# Patient Record
Sex: FEMALE | Race: WHITE | NOT HISPANIC OR LATINO
[De-identification: names, ages, dates, MRNs, and addresses within clinical notes are randomized per-mention and may not be internally consistent; named-entity substitution may affect disease eponyms.]

---

## 2017-07-06 ENCOUNTER — APPOINTMENT (OUTPATIENT)
Dept: OTOLARYNGOLOGY | Facility: CLINIC | Age: 72
End: 2017-07-06

## 2017-07-06 VITALS
HEART RATE: 60 BPM | BODY MASS INDEX: 26.88 KG/M2 | HEIGHT: 71 IN | WEIGHT: 192 LBS | SYSTOLIC BLOOD PRESSURE: 119 MMHG | DIASTOLIC BLOOD PRESSURE: 66 MMHG

## 2017-07-06 DIAGNOSIS — J03.90 ACUTE TONSILLITIS, UNSPECIFIED: ICD-10-CM

## 2017-07-06 DIAGNOSIS — H81.399 OTHER PERIPHERAL VERTIGO, UNSPECIFIED EAR: ICD-10-CM

## 2017-07-06 DIAGNOSIS — Z87.39 PERSONAL HISTORY OF OTHER DISEASES OF THE MUSCULOSKELETAL SYSTEM AND CONNECTIVE TISSUE: ICD-10-CM

## 2017-07-18 ENCOUNTER — APPOINTMENT (OUTPATIENT)
Dept: OTOLARYNGOLOGY | Facility: CLINIC | Age: 72
End: 2017-07-18

## 2017-07-18 VITALS
WEIGHT: 192 LBS | SYSTOLIC BLOOD PRESSURE: 139 MMHG | HEART RATE: 60 BPM | DIASTOLIC BLOOD PRESSURE: 85 MMHG | HEIGHT: 70 IN | BODY MASS INDEX: 27.49 KG/M2

## 2017-07-18 DIAGNOSIS — R05 COUGH: ICD-10-CM

## 2017-07-18 DIAGNOSIS — R07.0 PAIN IN THROAT: ICD-10-CM

## 2017-07-25 ENCOUNTER — APPOINTMENT (OUTPATIENT)
Dept: OTOLARYNGOLOGY | Facility: CLINIC | Age: 72
End: 2017-07-25

## 2017-07-25 VITALS
HEIGHT: 70 IN | DIASTOLIC BLOOD PRESSURE: 78 MMHG | HEART RATE: 60 BPM | WEIGHT: 192 LBS | SYSTOLIC BLOOD PRESSURE: 135 MMHG | BODY MASS INDEX: 27.49 KG/M2

## 2017-07-25 DIAGNOSIS — R07.0 PAIN IN THROAT: ICD-10-CM

## 2017-07-25 DIAGNOSIS — Z86.19 PERSONAL HISTORY OF OTHER INFECTIOUS AND PARASITIC DISEASES: ICD-10-CM

## 2017-07-25 DIAGNOSIS — J31.2 CHRONIC PHARYNGITIS: ICD-10-CM

## 2017-07-26 PROBLEM — R07.0 THROAT PAIN: Status: ACTIVE | Noted: 2017-07-18

## 2017-07-26 PROBLEM — J31.2 PHARYNGITIS, CHRONIC: Status: ACTIVE | Noted: 2017-07-26

## 2017-07-28 ENCOUNTER — MESSAGE (OUTPATIENT)
Age: 72
End: 2017-07-28

## 2017-10-20 ENCOUNTER — APPOINTMENT (OUTPATIENT)
Dept: GASTROENTEROLOGY | Facility: CLINIC | Age: 72
End: 2017-10-20
Payer: MEDICARE

## 2017-10-20 VITALS
DIASTOLIC BLOOD PRESSURE: 70 MMHG | SYSTOLIC BLOOD PRESSURE: 128 MMHG | WEIGHT: 192 LBS | HEIGHT: 70 IN | TEMPERATURE: 98.6 F | BODY MASS INDEX: 27.49 KG/M2 | OXYGEN SATURATION: 98 % | HEART RATE: 78 BPM

## 2017-10-20 DIAGNOSIS — Z91.89 OTHER SPECIFIED PERSONAL RISK FACTORS, NOT ELSEWHERE CLASSIFIED: ICD-10-CM

## 2017-10-20 DIAGNOSIS — Z85.3 PERSONAL HISTORY OF MALIGNANT NEOPLASM OF BREAST: ICD-10-CM

## 2017-10-20 DIAGNOSIS — Z12.11 ENCOUNTER FOR SCREENING FOR MALIGNANT NEOPLASM OF COLON: ICD-10-CM

## 2017-10-20 PROCEDURE — 99204 OFFICE O/P NEW MOD 45 MIN: CPT

## 2017-10-20 RX ORDER — AZITHROMYCIN 250 MG/1
250 TABLET, FILM COATED ORAL
Qty: 6 | Refills: 0 | Status: DISCONTINUED | COMMUNITY
Start: 2017-02-08 | End: 2017-10-20

## 2017-10-20 RX ORDER — METHYLPREDNISOLONE 4 MG/1
4 TABLET ORAL
Qty: 1 | Refills: 3 | Status: DISCONTINUED | COMMUNITY
Start: 2017-07-18 | End: 2017-10-20

## 2017-10-20 RX ORDER — OMEGA-3/DHA/EPA/FISH OIL 300-1000MG
1000 CAPSULE ORAL
Refills: 0 | Status: ACTIVE | COMMUNITY

## 2017-10-20 RX ORDER — ACYCLOVIR 400 MG/1
400 TABLET ORAL 3 TIMES DAILY
Qty: 21 | Refills: 0 | Status: DISCONTINUED | COMMUNITY
Start: 2017-07-25 | End: 2017-10-20

## 2017-10-20 RX ORDER — TRAMADOL HYDROCHLORIDE 50 MG/1
50 TABLET, COATED ORAL
Qty: 60 | Refills: 0 | Status: DISCONTINUED | COMMUNITY
Start: 2017-01-18 | End: 2017-10-20

## 2017-10-20 RX ORDER — LANCETS 33 GAUGE
EACH MISCELLANEOUS
Qty: 100 | Refills: 0 | Status: DISCONTINUED | COMMUNITY
Start: 2016-08-18 | End: 2017-10-20

## 2017-10-20 RX ORDER — CIPROFLOXACIN HYDROCHLORIDE 250 MG/1
250 TABLET, FILM COATED ORAL
Qty: 10 | Refills: 0 | Status: DISCONTINUED | COMMUNITY
Start: 2017-06-22 | End: 2017-10-20

## 2017-10-20 RX ORDER — CLINDAMYCIN HYDROCHLORIDE 300 MG/1
300 CAPSULE ORAL TWICE DAILY
Qty: 20 | Refills: 1 | Status: DISCONTINUED | COMMUNITY
Start: 2017-07-06 | End: 2017-10-20

## 2017-10-20 RX ORDER — BLOOD SUGAR DIAGNOSTIC
STRIP MISCELLANEOUS
Qty: 100 | Refills: 0 | Status: DISCONTINUED | COMMUNITY
Start: 2017-05-22 | End: 2017-10-20

## 2017-11-29 ENCOUNTER — APPOINTMENT (OUTPATIENT)
Dept: GASTROENTEROLOGY | Facility: AMBULATORY MEDICAL SERVICES | Age: 72
End: 2017-11-29
Payer: MEDICARE

## 2017-11-29 PROCEDURE — 45380 COLONOSCOPY AND BIOPSY: CPT | Mod: PT

## 2017-12-12 ENCOUNTER — APPOINTMENT (OUTPATIENT)
Dept: GASTROENTEROLOGY | Facility: CLINIC | Age: 72
End: 2017-12-12
Payer: MEDICARE

## 2017-12-12 VITALS
TEMPERATURE: 98.6 F | SYSTOLIC BLOOD PRESSURE: 130 MMHG | WEIGHT: 194 LBS | DIASTOLIC BLOOD PRESSURE: 70 MMHG | OXYGEN SATURATION: 99 % | HEART RATE: 82 BPM | BODY MASS INDEX: 27.77 KG/M2 | HEIGHT: 70 IN

## 2017-12-12 PROCEDURE — 99213 OFFICE O/P EST LOW 20 MIN: CPT

## 2018-07-10 ENCOUNTER — NON-APPOINTMENT (OUTPATIENT)
Age: 73
End: 2018-07-10

## 2018-07-10 ENCOUNTER — APPOINTMENT (OUTPATIENT)
Dept: ELECTROPHYSIOLOGY | Facility: CLINIC | Age: 73
End: 2018-07-10
Payer: MEDICARE

## 2018-07-10 VITALS
HEART RATE: 50 BPM | OXYGEN SATURATION: 99 % | BODY MASS INDEX: 27.2 KG/M2 | SYSTOLIC BLOOD PRESSURE: 149 MMHG | WEIGHT: 190 LBS | DIASTOLIC BLOOD PRESSURE: 90 MMHG | HEIGHT: 70 IN

## 2018-07-10 PROCEDURE — 93000 ELECTROCARDIOGRAM COMPLETE: CPT

## 2018-07-10 PROCEDURE — 99205 OFFICE O/P NEW HI 60 MIN: CPT

## 2018-07-10 RX ORDER — DULOXETINE HYDROCHLORIDE 60 MG/1
60 CAPSULE, DELAYED RELEASE PELLETS ORAL
Refills: 0 | Status: ACTIVE | COMMUNITY

## 2018-07-10 RX ORDER — AMLODIPINE BESYLATE 5 MG/1
5 TABLET ORAL DAILY
Qty: 30 | Refills: 5 | Status: ACTIVE | COMMUNITY
Start: 2016-10-20

## 2018-07-10 RX ORDER — SODIUM SULFATE, POTASSIUM SULFATE, MAGNESIUM SULFATE 17.5; 3.13; 1.6 G/ML; G/ML; G/ML
17.5-3.13-1.6 SOLUTION, CONCENTRATE ORAL
Qty: 1 | Refills: 0 | Status: DISCONTINUED | COMMUNITY
Start: 2017-10-20 | End: 2018-07-10

## 2018-07-10 RX ORDER — DORZOLAMIDE HYDROCHLORIDE TIMOLOL MALEATE 20; 5 MG/ML; MG/ML
22.3-6.8 SOLUTION/ DROPS OPHTHALMIC
Refills: 0 | Status: ACTIVE | COMMUNITY

## 2018-07-10 RX ORDER — LATANOPROST/PF 0.005 %
0.01 DROPS OPHTHALMIC (EYE)
Refills: 0 | Status: ACTIVE | COMMUNITY

## 2018-07-12 ENCOUNTER — CHART COPY (OUTPATIENT)
Age: 73
End: 2018-07-12

## 2018-07-16 ENCOUNTER — OUTPATIENT (OUTPATIENT)
Dept: OUTPATIENT SERVICES | Facility: HOSPITAL | Age: 73
LOS: 1 days | End: 2018-07-16
Payer: MEDICARE

## 2018-07-16 VITALS
HEIGHT: 70 IN | OXYGEN SATURATION: 99 % | HEART RATE: 50 BPM | WEIGHT: 190.04 LBS | TEMPERATURE: 98 F | SYSTOLIC BLOOD PRESSURE: 151 MMHG | RESPIRATION RATE: 16 BRPM | DIASTOLIC BLOOD PRESSURE: 73 MMHG

## 2018-07-16 DIAGNOSIS — Z45.018 ENCOUNTER FOR ADJUSTMENT AND MANAGEMENT OF OTHER PART OF CARDIAC PACEMAKER: ICD-10-CM

## 2018-07-16 LAB
ALBUMIN SERPL ELPH-MCNC: 4.6 G/DL — SIGNIFICANT CHANGE UP (ref 3.3–5)
ALP SERPL-CCNC: 93 U/L — SIGNIFICANT CHANGE UP (ref 40–120)
ALT FLD-CCNC: 24 U/L — SIGNIFICANT CHANGE UP (ref 10–45)
ANION GAP SERPL CALC-SCNC: 13 MMOL/L — SIGNIFICANT CHANGE UP (ref 5–17)
APTT BLD: 30.6 SEC — SIGNIFICANT CHANGE UP (ref 27.5–37.4)
AST SERPL-CCNC: 20 U/L — SIGNIFICANT CHANGE UP (ref 10–40)
BILIRUB SERPL-MCNC: 1.6 MG/DL — HIGH (ref 0.2–1.2)
BUN SERPL-MCNC: 10 MG/DL — SIGNIFICANT CHANGE UP (ref 7–23)
CALCIUM SERPL-MCNC: 9.5 MG/DL — SIGNIFICANT CHANGE UP (ref 8.4–10.5)
CHLORIDE SERPL-SCNC: 100 MMOL/L — SIGNIFICANT CHANGE UP (ref 96–108)
CO2 SERPL-SCNC: 29 MMOL/L — SIGNIFICANT CHANGE UP (ref 22–31)
CREAT SERPL-MCNC: 0.7 MG/DL — SIGNIFICANT CHANGE UP (ref 0.5–1.3)
GLUCOSE SERPL-MCNC: 108 MG/DL — HIGH (ref 70–99)
HCT VFR BLD CALC: 41.6 % — SIGNIFICANT CHANGE UP (ref 34.5–45)
HGB BLD-MCNC: 14.7 G/DL — SIGNIFICANT CHANGE UP (ref 11.5–15.5)
INR BLD: 1.17 RATIO — HIGH (ref 0.88–1.16)
MCHC RBC-ENTMCNC: 32.2 PG — SIGNIFICANT CHANGE UP (ref 27–34)
MCHC RBC-ENTMCNC: 35.4 GM/DL — SIGNIFICANT CHANGE UP (ref 32–36)
MCV RBC AUTO: 91.1 FL — SIGNIFICANT CHANGE UP (ref 80–100)
PLATELET # BLD AUTO: 176 K/UL — SIGNIFICANT CHANGE UP (ref 150–400)
POTASSIUM SERPL-MCNC: 3.6 MMOL/L — SIGNIFICANT CHANGE UP (ref 3.5–5.3)
POTASSIUM SERPL-SCNC: 3.6 MMOL/L — SIGNIFICANT CHANGE UP (ref 3.5–5.3)
PROT SERPL-MCNC: 7.6 G/DL — SIGNIFICANT CHANGE UP (ref 6–8.3)
PROTHROM AB SERPL-ACNC: 12.8 SEC — HIGH (ref 9.8–12.7)
RBC # BLD: 4.57 M/UL — SIGNIFICANT CHANGE UP (ref 3.8–5.2)
RBC # FLD: 12.5 % — SIGNIFICANT CHANGE UP (ref 10.3–14.5)
SODIUM SERPL-SCNC: 142 MMOL/L — SIGNIFICANT CHANGE UP (ref 135–145)
WBC # BLD: 8.2 K/UL — SIGNIFICANT CHANGE UP (ref 3.8–10.5)
WBC # FLD AUTO: 8.2 K/UL — SIGNIFICANT CHANGE UP (ref 3.8–10.5)

## 2018-07-16 PROCEDURE — 33228 REMV&REPLC PM GEN DUAL LEAD: CPT

## 2018-07-16 PROCEDURE — 99152 MOD SED SAME PHYS/QHP 5/>YRS: CPT

## 2018-07-16 PROCEDURE — 93005 ELECTROCARDIOGRAM TRACING: CPT

## 2018-07-16 PROCEDURE — 80053 COMPREHEN METABOLIC PANEL: CPT

## 2018-07-16 PROCEDURE — C1785: CPT

## 2018-07-16 PROCEDURE — 85027 COMPLETE CBC AUTOMATED: CPT

## 2018-07-16 PROCEDURE — 99153 MOD SED SAME PHYS/QHP EA: CPT

## 2018-07-16 PROCEDURE — 85730 THROMBOPLASTIN TIME PARTIAL: CPT

## 2018-07-16 PROCEDURE — 85610 PROTHROMBIN TIME: CPT

## 2018-07-16 PROCEDURE — 93010 ELECTROCARDIOGRAM REPORT: CPT

## 2018-07-16 RX ORDER — METOPROLOL TARTRATE 50 MG
1 TABLET ORAL
Qty: 0 | Refills: 0 | COMMUNITY

## 2018-07-16 RX ORDER — DORZOLAMIDE HYDROCHLORIDE 20 MG/ML
1 SOLUTION/ DROPS OPHTHALMIC
Qty: 0 | Refills: 0 | COMMUNITY

## 2018-07-16 RX ORDER — LATANOPROST 0.05 MG/ML
1 SOLUTION/ DROPS OPHTHALMIC; TOPICAL
Qty: 0 | Refills: 0 | COMMUNITY

## 2018-07-16 RX ORDER — ATORVASTATIN CALCIUM 80 MG/1
1 TABLET, FILM COATED ORAL
Qty: 0 | Refills: 0 | COMMUNITY

## 2018-07-16 RX ORDER — METFORMIN HYDROCHLORIDE 850 MG/1
1 TABLET ORAL
Qty: 0 | Refills: 0 | COMMUNITY

## 2018-07-16 RX ORDER — RIVAROXABAN 15 MG-20MG
1 KIT ORAL
Qty: 0 | Refills: 0 | COMMUNITY

## 2018-07-16 RX ORDER — DULOXETINE HYDROCHLORIDE 30 MG/1
1 CAPSULE, DELAYED RELEASE ORAL
Qty: 0 | Refills: 0 | COMMUNITY

## 2018-07-16 RX ORDER — AMLODIPINE BESYLATE 2.5 MG/1
1 TABLET ORAL
Qty: 0 | Refills: 0 | COMMUNITY

## 2018-07-16 RX ORDER — DIGOXIN 250 MCG
1 TABLET ORAL
Qty: 0 | Refills: 0 | COMMUNITY

## 2018-07-16 NOTE — H&P CARDIOLOGY - PSH
Cardiac Pacemaker insertion 2007    History of Appendectomy    History of Cholecystectomy    History of Left Mastectomy 2007    History of Total Knee Replacement , right 2008, left 2007  bilateral 2007, 2008  Personal history of spine surgery

## 2018-07-16 NOTE — H&P CARDIOLOGY - PMH
AF (Atrial Fibrillation) -2007 - on coumadin - pacemaker/ icd  implanted    CA - Breast Cancer left -2007-stage 1-post surgery    Diabetes mellitus    Dyslipidemia    Glaucoma - both eyes    HLD (hyperlipidemia)    HTN (Hypertension) Atrial fibrillation    CA - Breast Cancer left -2007-stage 1-post surgery    Diabetes mellitus    Dyslipidemia    Glaucoma - both eyes    HLD (hyperlipidemia)    HTN (Hypertension)

## 2018-07-16 NOTE — H&P CARDIOLOGY - HISTORY OF PRESENT ILLNESS
This is a 73 year old  female with PMH of Afib on coumadin ( last dose ), breast ca s/p resection ( no chemo, no radiation), HLD, HTN, Glaucoma, CAD  s/p 1 stent in 2005, tachy juan syndrome s/p PPM implant in 2007, Dm type 2 ( well managed, w/o complications as per patient, last A1C unknown). This is a 73 year old  female with PMH of Afib on Xarelto ( last dose 7/13/2018 ca s/p resection ( no chemo, no radiation), HLD, HTN, Glaucoma, CAD  s/p 1 stent in 2005, tachy juan syndrome s/p PPM implant ( [employer]), Dm type 2 ( well managed, w/o complications as per patient, last A1C unknown). On interrogation PPM is EOL, she is at VVI 50bpm.  Seen and evaluated by Dr ALFONSO Aleman, referred here today for PPM generator change.  Presently asymptomatic denies chest pain, dyspnea, dizziness, palpitations, N&V, HA, abd pain, fever/chills.     PCP: Philippe Scales

## 2018-07-30 ENCOUNTER — APPOINTMENT (OUTPATIENT)
Dept: ELECTROPHYSIOLOGY | Facility: CLINIC | Age: 73
End: 2018-07-30
Payer: MEDICARE

## 2018-07-30 VITALS — OXYGEN SATURATION: 99 % | HEART RATE: 59 BPM | DIASTOLIC BLOOD PRESSURE: 74 MMHG | SYSTOLIC BLOOD PRESSURE: 138 MMHG

## 2018-07-30 PROCEDURE — 99024 POSTOP FOLLOW-UP VISIT: CPT

## 2018-08-01 PROBLEM — E11.9 TYPE 2 DIABETES MELLITUS WITHOUT COMPLICATIONS: Chronic | Status: ACTIVE | Noted: 2018-07-16

## 2018-08-01 PROBLEM — E78.5 HYPERLIPIDEMIA, UNSPECIFIED: Chronic | Status: ACTIVE | Noted: 2018-07-16

## 2018-08-01 PROBLEM — I48.91 UNSPECIFIED ATRIAL FIBRILLATION: Chronic | Status: ACTIVE | Noted: 2018-07-16

## 2018-10-30 ENCOUNTER — APPOINTMENT (OUTPATIENT)
Dept: ELECTROPHYSIOLOGY | Facility: CLINIC | Age: 73
End: 2018-10-30
Payer: MEDICARE

## 2018-10-30 PROCEDURE — 93294 REM INTERROG EVL PM/LDLS PM: CPT

## 2018-10-30 PROCEDURE — 93296 REM INTERROG EVL PM/IDS: CPT

## 2019-02-19 ENCOUNTER — APPOINTMENT (OUTPATIENT)
Dept: ELECTROPHYSIOLOGY | Facility: CLINIC | Age: 74
End: 2019-02-19
Payer: MEDICARE

## 2019-02-19 PROCEDURE — 93294 REM INTERROG EVL PM/LDLS PM: CPT

## 2019-02-19 PROCEDURE — 93296 REM INTERROG EVL PM/IDS: CPT

## 2019-05-23 ENCOUNTER — APPOINTMENT (OUTPATIENT)
Dept: ELECTROPHYSIOLOGY | Facility: CLINIC | Age: 74
End: 2019-05-23
Payer: MEDICARE

## 2019-05-23 PROCEDURE — 93296 REM INTERROG EVL PM/IDS: CPT

## 2019-05-23 PROCEDURE — 93294 REM INTERROG EVL PM/LDLS PM: CPT

## 2019-08-08 ENCOUNTER — NON-APPOINTMENT (OUTPATIENT)
Age: 74
End: 2019-08-08

## 2019-08-08 ENCOUNTER — APPOINTMENT (OUTPATIENT)
Dept: CARDIOLOGY | Facility: CLINIC | Age: 74
End: 2019-08-08
Payer: MEDICARE

## 2019-08-08 VITALS
HEIGHT: 70 IN | HEART RATE: 61 BPM | OXYGEN SATURATION: 98 % | SYSTOLIC BLOOD PRESSURE: 130 MMHG | BODY MASS INDEX: 27.2 KG/M2 | WEIGHT: 190 LBS | DIASTOLIC BLOOD PRESSURE: 80 MMHG

## 2019-08-08 DIAGNOSIS — R21 RASH AND OTHER NONSPECIFIC SKIN ERUPTION: ICD-10-CM

## 2019-08-08 DIAGNOSIS — R60.9 EDEMA, UNSPECIFIED: ICD-10-CM

## 2019-08-08 PROCEDURE — 93000 ELECTROCARDIOGRAM COMPLETE: CPT

## 2019-08-08 PROCEDURE — 99214 OFFICE O/P EST MOD 30 MIN: CPT

## 2019-08-08 NOTE — PHYSICAL EXAM
[General Appearance - Well Developed] : well developed [Normal Appearance] : normal appearance [General Appearance - Well Nourished] : well nourished [Well Groomed] : well groomed [No Deformities] : no deformities [General Appearance - In No Acute Distress] : no acute distress [Normal Conjunctiva] : the conjunctiva exhibited no abnormalities [Normal Oral Mucosa] : normal oral mucosa [Eyelids - No Xanthelasma] : the eyelids demonstrated no xanthelasmas [No Oral Cyanosis] : no oral cyanosis [No Oral Pallor] : no oral pallor [No Jugular Venous Reid A Waves] : no jugular venous reid A waves [Normal Jugular Venous V Waves Present] : normal jugular venous V waves present [Normal Jugular Venous A Waves Present] : normal jugular venous A waves present [Respiration, Rhythm And Depth] : normal respiratory rhythm and effort [Exaggerated Use Of Accessory Muscles For Inspiration] : no accessory muscle use [Auscultation Breath Sounds / Voice Sounds] : lungs were clear to auscultation bilaterally [Heart Sounds] : normal S1 and S2 [Heart Rate And Rhythm] : heart rate and rhythm were normal [Abdomen Tenderness] : non-tender [Abdomen Soft] : soft [Murmurs] : no murmurs present [Abdomen Mass (___ Cm)] : no abdominal mass palpated [Abnormal Walk] : normal gait [Gait - Sufficient For Exercise Testing] : the gait was sufficient for exercise testing [Nail Clubbing] : no clubbing of the fingernails [Cyanosis, Localized] : no localized cyanosis [Petechial Hemorrhages (___cm)] : no petechial hemorrhages [Skin Color & Pigmentation] : normal skin color and pigmentation [] : no ischemic changes [Skin Lesions] : no skin lesions [No Venous Stasis] : no venous stasis [No Xanthoma] : no  xanthoma was observed [No Skin Ulcers] : no skin ulcer [Oriented To Time, Place, And Person] : oriented to person, place, and time [Mood] : the mood was normal [Affect] : the affect was normal [No Anxiety] : not feeling anxious

## 2019-08-09 ENCOUNTER — RX CHANGE (OUTPATIENT)
Age: 74
End: 2019-08-09

## 2019-08-09 ENCOUNTER — MEDICATION RENEWAL (OUTPATIENT)
Age: 74
End: 2019-08-09

## 2019-08-09 DIAGNOSIS — R78.89 FINDING OF OTHER SPECIFIED SUBSTANCES, NOT NORMALLY FOUND IN BLOOD: ICD-10-CM

## 2019-08-09 LAB
ALBUMIN SERPL ELPH-MCNC: 4.2 G/DL
ALP BLD-CCNC: 88 U/L
ALT SERPL-CCNC: 16 U/L
ANION GAP SERPL CALC-SCNC: 15 MMOL/L
AST SERPL-CCNC: 12 U/L
BASOPHILS # BLD AUTO: 0.05 K/UL
BASOPHILS NFR BLD AUTO: 0.7 %
BILIRUB DIRECT SERPL-MCNC: 0.2 MG/DL
BILIRUB INDIRECT SERPL-MCNC: 0.7 MG/DL
BILIRUB SERPL-MCNC: 0.9 MG/DL
BUN SERPL-MCNC: 9 MG/DL
CALCIUM SERPL-MCNC: 9.6 MG/DL
CHLORIDE SERPL-SCNC: 102 MMOL/L
CHOLEST SERPL-MCNC: 145 MG/DL
CHOLEST/HDLC SERPL: 3.5 RATIO
CK SERPL-CCNC: 60 U/L
CO2 SERPL-SCNC: 28 MMOL/L
CREAT SERPL-MCNC: 0.67 MG/DL
DIGOXIN SERPL-MCNC: 1.1 NG/ML
EOSINOPHIL # BLD AUTO: 0.15 K/UL
EOSINOPHIL NFR BLD AUTO: 2 %
ESTIMATED AVERAGE GLUCOSE: 137 MG/DL
GLUCOSE SERPL-MCNC: 166 MG/DL
HBA1C MFR BLD HPLC: 6.4 %
HCT VFR BLD CALC: 42.4 %
HDLC SERPL-MCNC: 42 MG/DL
HGB BLD-MCNC: 13.4 G/DL
IMM GRANULOCYTES NFR BLD AUTO: 0.3 %
LDLC SERPL CALC-MCNC: 72 MG/DL
LDLC SERPL DIRECT ASSAY-MCNC: 93 MG/DL
LYMPHOCYTES # BLD AUTO: 2.07 K/UL
LYMPHOCYTES NFR BLD AUTO: 28 %
MAN DIFF?: NORMAL
MCHC RBC-ENTMCNC: 29.8 PG
MCHC RBC-ENTMCNC: 31.6 GM/DL
MCV RBC AUTO: 94.4 FL
MONOCYTES # BLD AUTO: 0.62 K/UL
MONOCYTES NFR BLD AUTO: 8.4 %
NEUTROPHILS # BLD AUTO: 4.49 K/UL
NEUTROPHILS NFR BLD AUTO: 60.6 %
PLATELET # BLD AUTO: 182 K/UL
POTASSIUM SERPL-SCNC: 4.4 MMOL/L
PROT SERPL-MCNC: 6.7 G/DL
RBC # BLD: 4.49 M/UL
RBC # FLD: 12.6 %
SODIUM SERPL-SCNC: 145 MMOL/L
TRIGL SERPL-MCNC: 156 MG/DL
WBC # FLD AUTO: 7.4 K/UL

## 2019-08-09 RX ORDER — HYDROCHLOROTHIAZIDE 12.5 MG/1
12.5 CAPSULE ORAL DAILY
Qty: 30 | Refills: 11 | Status: DISCONTINUED | COMMUNITY
End: 2019-08-09

## 2019-08-09 RX ORDER — MOMETASONE FUROATE 1 MG/G
0.1 CREAM TOPICAL TWICE DAILY
Qty: 1 | Refills: 0 | Status: ACTIVE | COMMUNITY
Start: 2019-08-09 | End: 1900-01-01

## 2019-08-09 RX ORDER — METFORMIN ER 500 MG 500 MG/1
500 TABLET ORAL
Qty: 60 | Refills: 0 | Status: ACTIVE | COMMUNITY
Start: 2017-01-20

## 2019-08-17 NOTE — HISTORY OF PRESENT ILLNESS
[FreeTextEntry1] : The patient presents today for evaluation of complaints of leg Edema and rash. They admit to high dietary sodium intake recently. The patient denies any chest pain, shortness of breath, PND. Orthopnea, dizziness, nausea, vomiting, lightheadedness, abdominal pain, or fever.\par \par The patient presents for evaluation of high blood pressure. Patient is currently tolerating the current antihypertensive regime and they deny headaches, stiff neck, visual changes, pedal Edema or PND. They also are here for follow-up of elevated cholesterol and continued care of diabetes mellitus. Patient is currently tolerating medication and denies muscle pain, joint pain, back pain, tea, nausea, vomiting, abdominal pain or diarrhea. The patient is trying to follow a low cholesterol diet.  The patient is following the diabetic regimen and is tolerating hypoglycemic agents and following the diet.\par \par Atrial fibrillation\par The patient is also here for follow-up of atrial fibrillation and currently they feel well with an occasional episode of palpitations.  The patient states they are reliably taking the anticoagulation medicine and are not having any signs of bleeding they deny any dizziness headaches nosebleeds or black tarry stools\par \par \par

## 2019-08-22 ENCOUNTER — APPOINTMENT (OUTPATIENT)
Dept: CARDIOLOGY | Facility: CLINIC | Age: 74
End: 2019-08-22
Payer: MEDICARE

## 2019-08-22 PROCEDURE — 93280 PM DEVICE PROGR EVAL DUAL: CPT

## 2019-08-26 ENCOUNTER — APPOINTMENT (OUTPATIENT)
Dept: ELECTROPHYSIOLOGY | Facility: CLINIC | Age: 74
End: 2019-08-26
Payer: MEDICARE

## 2019-08-26 PROCEDURE — 93296 REM INTERROG EVL PM/IDS: CPT

## 2019-08-26 PROCEDURE — 93294 REM INTERROG EVL PM/LDLS PM: CPT

## 2019-08-30 ENCOUNTER — APPOINTMENT (OUTPATIENT)
Dept: OTOLARYNGOLOGY | Facility: CLINIC | Age: 74
End: 2019-08-30
Payer: MEDICARE

## 2019-08-30 VITALS
BODY MASS INDEX: 27.2 KG/M2 | WEIGHT: 190 LBS | DIASTOLIC BLOOD PRESSURE: 87 MMHG | HEART RATE: 59 BPM | HEIGHT: 70 IN | SYSTOLIC BLOOD PRESSURE: 133 MMHG

## 2019-08-30 DIAGNOSIS — K21.9 GASTRO-ESOPHAGEAL REFLUX DISEASE W/OUT ESOPHAGITIS: ICD-10-CM

## 2019-08-30 DIAGNOSIS — H61.23 IMPACTED CERUMEN, BILATERAL: ICD-10-CM

## 2019-08-30 DIAGNOSIS — J34.2 DEVIATED NASAL SEPTUM: ICD-10-CM

## 2019-08-30 DIAGNOSIS — R42 DIZZINESS AND GIDDINESS: ICD-10-CM

## 2019-08-30 DIAGNOSIS — H90.3 SENSORINEURAL HEARING LOSS, BILATERAL: ICD-10-CM

## 2019-08-30 PROCEDURE — 92567 TYMPANOMETRY: CPT

## 2019-08-30 PROCEDURE — 92557 COMPREHENSIVE HEARING TEST: CPT

## 2019-08-30 PROCEDURE — 99213 OFFICE O/P EST LOW 20 MIN: CPT | Mod: 25

## 2019-08-30 NOTE — PHYSICAL EXAM
[de-identified] : l [de-identified] : minor erythema of the bilateral tontils, small exudate on the right tonsil [Midline] : trachea located in midline position [Normal] : salivary glands are normal

## 2019-08-30 NOTE — REVIEW OF SYSTEMS
[Hearing Loss] : hearing loss [As Noted in HPI] : as noted in HPI [Throat Pain] : throat pain [Negative] : Heme/Lymph [de-identified] : coughing

## 2019-08-30 NOTE — REASON FOR VISIT
[Subsequent Evaluation] : a subsequent evaluation for [Hearing Loss] : hearing loss [FreeTextEntry2] : hearing loss and nasal growth

## 2019-08-30 NOTE — HISTORY OF PRESENT ILLNESS
[No] : patient does not have a  history of radiation therapy [de-identified] : 75 y/o female who came in complaining of worsening hearing loss R>L over the years and a nasal growth that she has been there for 5-6 months. She states her ears feel clogged R>L. She states she has been feeling a lump in her nose but it is not painful.  She has no throat complaints. Pt has no ear pain, ear drainage, tinnitus, vertigo, nasal congestion, nasal discharge, epistaxis, sinus infections, facial pain, facial pressure, throat pain, dysphagia or fevers\par  [Tinnitus] : no tinnitus [Vertigo] : no vertigo [Anxiety] : no anxiety [Dizziness] : no dizziness [Headache] : no headache [Hearing Loss] : hearing loss [Otalgia] : no otalgia [Otorrhea] : no otorrhea [Visual Changes] : no visual changes [Recurrent Otitis Media] : no recurrent otitis media [Loud Noise Exposure] : no history of loud noise exposure [Otitis Media with Effusion] : no otitis media with effusion [Smoking] : no smoking [Early Onset Hearing Loss] : no early onset hearing loss [Facial Pain] : no facial pain [Stroke] : no stroke [Clear Rhinorrhea] : no clear rhinorrhea [Facial Pressure] : no facial pressure [Nasal Congestion] : no nasal congestion [Purulent Rhinorrhea] : no purulent rhinorrhea [Postnasal Drainage] : no postnasal drainage [Ear Pain] : no ear pain [Ear Pressure] : no ear pressure [Diplopia] : no diplopia [Ear Fullness] : no ear fullness [Allergic Rhinitis] : no allergic rhinitis [Seasonal Allergies] : no seasonal allergies [Environmental Allergies] : no environmental allergies [Environmental Allergens] : no environmental allergens [Adenoidectomy] : no adenoidectomy [Allergies] : no allergies [Asthma] : no asthma [Neck Mass] : no neck mass [Neck Pain] : no neck pain [Cold Intolerance] : no cold intolerance [Chills] : no chills [Cough] : no cough [Fatigue] : no fatigue [Hyperthyroidism] : no hyperthyroidism [Heat Intolerance] : no heat intolerance [Sialadenitis] : no sialadenitis [Hodgkin Disease] : no hodgkin disease [Non-Hodgkin Lymphoma] : no non-hodgkin lymphoma [Tobacco Use] : no tobacco use [Alcohol Use] : no alcohol use [Graves Disease] : no graves disease [Thyroid Cancer] : no thyroid cancer

## 2019-08-30 NOTE — ASSESSMENT
[FreeTextEntry1] : 73 y/o female who came in complaining of hearing loss R>L and a possible nasal growth. There was no wax noted on exam. Her nasal exam was normal, there was no growth noted.\par \par Hearing loss R>L\par -Audiogram performed in office due to hearing loss complaint\par bilateral sensorineural hearing loss-cleared for hearing aids\par \par No obvious nasal abn noted\par Poss f/u Dr Matos for nsao-septal cartilage abn in vestibule

## 2019-08-30 NOTE — PHYSICAL EXAM
[de-identified] : l [de-identified] : minor erythema of the bilateral tontils, small exudate on the right tonsil [Midline] : trachea located in midline position [Normal] : salivary glands are normal

## 2019-08-30 NOTE — ASSESSMENT
[FreeTextEntry1] : 75 y/o female who came in complaining of hearing loss R>L and a possible nasal growth. There was no wax noted on exam. Her nasal exam was normal, there was no growth noted.\par \par Hearing loss R>L\par -Audiogram performed in office due to hearing loss complaint\par bilateral sensorineural hearing loss-cleared for hearing aids\par \par No obvious nasal abn noted\par Poss f/u Dr Matos for nsao-septal cartilage abn in vestibule

## 2019-08-30 NOTE — REVIEW OF SYSTEMS
[Hearing Loss] : hearing loss [As Noted in HPI] : as noted in HPI [Throat Pain] : throat pain [Negative] : Endocrine [de-identified] : coughing

## 2019-08-30 NOTE — HISTORY OF PRESENT ILLNESS
[No] : patient does not have a  history of radiation therapy [de-identified] : 75 y/o female who came in complaining of worsening hearing loss R>L over the years and a nasal growth that she has been there for 5-6 months. She states her ears feel clogged R>L. She states she has been feeling a lump in her nose but it is not painful.  She has no throat complaints. Pt has no ear pain, ear drainage, tinnitus, vertigo, nasal congestion, nasal discharge, epistaxis, sinus infections, facial pain, facial pressure, throat pain, dysphagia or fevers\par  [Tinnitus] : no tinnitus [Vertigo] : no vertigo [Anxiety] : no anxiety [Dizziness] : no dizziness [Headache] : no headache [Hearing Loss] : hearing loss [Otalgia] : no otalgia [Otorrhea] : no otorrhea [Recurrent Otitis Media] : no recurrent otitis media [Visual Changes] : no visual changes [Otitis Media with Effusion] : no otitis media with effusion [Loud Noise Exposure] : no history of loud noise exposure [Smoking] : no smoking [Early Onset Hearing Loss] : no early onset hearing loss [Stroke] : no stroke [Facial Pain] : no facial pain [Clear Rhinorrhea] : no clear rhinorrhea [Facial Pressure] : no facial pressure [Purulent Rhinorrhea] : no purulent rhinorrhea [Nasal Congestion] : no nasal congestion [Postnasal Drainage] : no postnasal drainage [Ear Pain] : no ear pain [Ear Pressure] : no ear pressure [Diplopia] : no diplopia [Ear Fullness] : no ear fullness [Allergic Rhinitis] : no allergic rhinitis [Seasonal Allergies] : no seasonal allergies [Environmental Allergies] : no environmental allergies [Environmental Allergens] : no environmental allergens [Adenoidectomy] : no adenoidectomy [Asthma] : no asthma [Allergies] : no allergies [Neck Mass] : no neck mass [Neck Pain] : no neck pain [Chills] : no chills [Cold Intolerance] : no cold intolerance [Cough] : no cough [Fatigue] : no fatigue [Hyperthyroidism] : no hyperthyroidism [Heat Intolerance] : no heat intolerance [Sialadenitis] : no sialadenitis [Non-Hodgkin Lymphoma] : no non-hodgkin lymphoma [Hodgkin Disease] : no hodgkin disease [Tobacco Use] : no tobacco use [Alcohol Use] : no alcohol use [Thyroid Cancer] : no thyroid cancer [Graves Disease] : no graves disease

## 2019-09-09 ENCOUNTER — APPOINTMENT (OUTPATIENT)
Dept: DERMATOLOGY | Facility: CLINIC | Age: 74
End: 2019-09-09
Payer: MEDICARE

## 2019-09-09 ENCOUNTER — LABORATORY RESULT (OUTPATIENT)
Age: 74
End: 2019-09-09

## 2019-09-09 VITALS
DIASTOLIC BLOOD PRESSURE: 68 MMHG | BODY MASS INDEX: 27.2 KG/M2 | SYSTOLIC BLOOD PRESSURE: 118 MMHG | HEIGHT: 70 IN | WEIGHT: 190 LBS

## 2019-09-09 DIAGNOSIS — L82.0 INFLAMED SEBORRHEIC KERATOSIS: ICD-10-CM

## 2019-09-09 DIAGNOSIS — Z78.9 OTHER SPECIFIED HEALTH STATUS: ICD-10-CM

## 2019-09-09 DIAGNOSIS — I87.2 VENOUS INSUFFICIENCY (CHRONIC) (PERIPHERAL): ICD-10-CM

## 2019-09-09 DIAGNOSIS — L82.1 OTHER SEBORRHEIC KERATOSIS: ICD-10-CM

## 2019-09-09 DIAGNOSIS — D48.5 NEOPLASM OF UNCERTAIN BEHAVIOR OF SKIN: ICD-10-CM

## 2019-09-09 DIAGNOSIS — Z56.0 UNEMPLOYMENT, UNSPECIFIED: ICD-10-CM

## 2019-09-09 DIAGNOSIS — D18.01 HEMANGIOMA OF SKIN AND SUBCUTANEOUS TISSUE: ICD-10-CM

## 2019-09-09 PROCEDURE — 11102 TANGNTL BX SKIN SINGLE LES: CPT | Mod: 59,Q5

## 2019-09-09 PROCEDURE — 17110 DESTRUCTION B9 LES UP TO 14: CPT | Mod: 59,Q5

## 2019-09-09 PROCEDURE — 99203 OFFICE O/P NEW LOW 30 MIN: CPT | Mod: 25,Q5

## 2019-09-09 SDOH — ECONOMIC STABILITY - INCOME SECURITY: UNEMPLOYMENT, UNSPECIFIED: Z56.0

## 2019-09-10 ENCOUNTER — APPOINTMENT (OUTPATIENT)
Dept: DERMATOLOGY | Facility: CLINIC | Age: 74
End: 2019-09-10

## 2019-10-17 ENCOUNTER — APPOINTMENT (OUTPATIENT)
Dept: DERMATOLOGY | Facility: CLINIC | Age: 74
End: 2019-10-17

## 2019-11-11 ENCOUNTER — MEDICATION RENEWAL (OUTPATIENT)
Age: 74
End: 2019-11-11

## 2019-11-26 ENCOUNTER — APPOINTMENT (OUTPATIENT)
Dept: ELECTROPHYSIOLOGY | Facility: CLINIC | Age: 74
End: 2019-11-26
Payer: MEDICARE

## 2019-11-26 PROCEDURE — 93296 REM INTERROG EVL PM/IDS: CPT

## 2019-11-26 PROCEDURE — 93294 REM INTERROG EVL PM/LDLS PM: CPT

## 2019-12-10 ENCOUNTER — APPOINTMENT (OUTPATIENT)
Dept: CARDIOLOGY | Facility: CLINIC | Age: 74
End: 2019-12-10
Payer: MEDICARE

## 2019-12-10 ENCOUNTER — NON-APPOINTMENT (OUTPATIENT)
Age: 74
End: 2019-12-10

## 2019-12-10 VITALS
HEART RATE: 59 BPM | WEIGHT: 195 LBS | RESPIRATION RATE: 16 BRPM | TEMPERATURE: 98.6 F | BODY MASS INDEX: 27.98 KG/M2 | OXYGEN SATURATION: 96 % | SYSTOLIC BLOOD PRESSURE: 134 MMHG | DIASTOLIC BLOOD PRESSURE: 82 MMHG

## 2019-12-10 DIAGNOSIS — C43.9 MALIGNANT MELANOMA OF SKIN, UNSPECIFIED: ICD-10-CM

## 2019-12-10 DIAGNOSIS — E11.9 TYPE 2 DIABETES MELLITUS W/OUT COMPLICATIONS: ICD-10-CM

## 2019-12-10 PROCEDURE — 93000 ELECTROCARDIOGRAM COMPLETE: CPT

## 2019-12-10 PROCEDURE — 99214 OFFICE O/P EST MOD 30 MIN: CPT

## 2019-12-10 NOTE — PHYSICAL EXAM
[Normal Appearance] : normal appearance [Well Groomed] : well groomed [General Appearance - Well Developed] : well developed [General Appearance - In No Acute Distress] : no acute distress [General Appearance - Well Nourished] : well nourished [No Deformities] : no deformities [Eyelids - No Xanthelasma] : the eyelids demonstrated no xanthelasmas [Normal Conjunctiva] : the conjunctiva exhibited no abnormalities [Normal Oral Mucosa] : normal oral mucosa [No Oral Pallor] : no oral pallor [No Oral Cyanosis] : no oral cyanosis [Normal Jugular Venous A Waves Present] : normal jugular venous A waves present [No Jugular Venous Reid A Waves] : no jugular venous reid A waves [Normal Jugular Venous V Waves Present] : normal jugular venous V waves present [Respiration, Rhythm And Depth] : normal respiratory rhythm and effort [Exaggerated Use Of Accessory Muscles For Inspiration] : no accessory muscle use [Auscultation Breath Sounds / Voice Sounds] : lungs were clear to auscultation bilaterally [Abdomen Soft] : soft [Abdomen Mass (___ Cm)] : no abdominal mass palpated [Abdomen Tenderness] : non-tender [Gait - Sufficient For Exercise Testing] : the gait was sufficient for exercise testing [Abnormal Walk] : normal gait [Cyanosis, Localized] : no localized cyanosis [Nail Clubbing] : no clubbing of the fingernails [Petechial Hemorrhages (___cm)] : no petechial hemorrhages [Skin Color & Pigmentation] : normal skin color and pigmentation [] : no rash [No Venous Stasis] : no venous stasis [No Skin Ulcers] : no skin ulcer [Skin Lesions] : no skin lesions [No Xanthoma] : no  xanthoma was observed [Oriented To Time, Place, And Person] : oriented to person, place, and time [Affect] : the affect was normal [Mood] : the mood was normal [No Anxiety] : not feeling anxious [FreeTextEntry1] : Regular rate and rhythm, NL S1, S2, non-displaced PMI, chest non-tender; no rubs,heaves  or gallops a  Grade 2/6 systolic murmur noted at the LSB

## 2019-12-10 NOTE — REASON FOR VISIT
[FreeTextEntry1] : The patient presents for evaluation of high blood pressure. Patient is currently tolerating the current  antihypertensive regime and they deny headaches, stiff neck, visual changes, pedal Edema or PND. They also are here for follow-up of elevated cholesterol. Patient is currently tolerating medication and and does not complain of new  muscle pain, joint pain, back pain,urinary changes ,nausea, vomiting, abdominal pain or diarrhea. The patient is trying to follow a low cholesterol diet. \par Pt states that she is switching insurance and they told her that Digoxin would be very expensive-inquiring if it can be switched to something else.\par Pt recently had blood drawn at [facility] ordered by Dr. Soto.\par Pt recently saw dermatologist Dr. Turpin in September where she had biopsy of melanoma on scalp, pt states no further intervention was required.

## 2020-02-26 ENCOUNTER — APPOINTMENT (OUTPATIENT)
Dept: ELECTROPHYSIOLOGY | Facility: CLINIC | Age: 75
End: 2020-02-26
Payer: MEDICARE

## 2020-02-26 PROCEDURE — 93296 REM INTERROG EVL PM/IDS: CPT

## 2020-02-26 PROCEDURE — 93294 REM INTERROG EVL PM/LDLS PM: CPT

## 2020-02-27 ENCOUNTER — APPOINTMENT (OUTPATIENT)
Dept: CARDIOLOGY | Facility: CLINIC | Age: 75
End: 2020-02-27
Payer: MEDICARE

## 2020-02-27 PROCEDURE — 93280 PM DEVICE PROGR EVAL DUAL: CPT

## 2020-02-27 PROCEDURE — 93306 TTE W/DOPPLER COMPLETE: CPT

## 2020-03-02 ENCOUNTER — APPOINTMENT (OUTPATIENT)
Dept: ELECTROPHYSIOLOGY | Facility: CLINIC | Age: 75
End: 2020-03-02
Payer: MEDICARE

## 2020-03-02 VITALS
DIASTOLIC BLOOD PRESSURE: 85 MMHG | HEIGHT: 70 IN | HEART RATE: 65 BPM | SYSTOLIC BLOOD PRESSURE: 145 MMHG | WEIGHT: 200 LBS | OXYGEN SATURATION: 98 % | BODY MASS INDEX: 28.63 KG/M2

## 2020-03-02 PROCEDURE — 93000 ELECTROCARDIOGRAM COMPLETE: CPT

## 2020-03-02 PROCEDURE — 99213 OFFICE O/P EST LOW 20 MIN: CPT

## 2020-03-02 NOTE — PHYSICAL EXAM
[General Appearance - Well Developed] : well developed [General Appearance - Well Nourished] : well nourished [Normal Conjunctiva] : the conjunctiva exhibited no abnormalities [No Oral Cyanosis] : no oral cyanosis [Respiration, Rhythm And Depth] : normal respiratory rhythm and effort [Exaggerated Use Of Accessory Muscles For Inspiration] : no accessory muscle use [Auscultation Breath Sounds / Voice Sounds] : lungs were clear to auscultation bilaterally [Heart Rate And Rhythm] : heart rate and rhythm were normal [Heart Sounds] : normal S1 and S2 [Murmurs] : no murmurs present [Arterial Pulses Normal] : the arterial pulses were normal [Bowel Sounds] : normal bowel sounds [Abdomen Soft] : soft [Abdomen Tenderness] : non-tender [Nail Clubbing] : no clubbing of the fingernails [Cyanosis, Localized] : no localized cyanosis [Skin Color & Pigmentation] : normal skin color and pigmentation [] : no rash [Oriented To Time, Place, And Person] : oriented to person, place, and time [No Anxiety] : not feeling anxious [Normal Oral Mucosa] : normal oral mucosa [Normal Oropharynx] : normal oropharynx [FreeTextEntry1] : Right chest PPM palpablable. Slight movement within pocket, though device appears well secured. No evidence of erythema, TTP, discharge. Scars well healed. No evidence of device migration since generator change.

## 2020-03-02 NOTE — DISCUSSION/SUMMARY
[FreeTextEntry1] : Mrs. Carrillo presents for evaluation. As you know she is a 74 year old female with a history of atrial fibrillation, diabetes, hypertension, and tachy-juan syndrome status post pacemaker in 2007 and generator change in 2018. Her device on exam today appears well seated within the pocket without any evidence of infection. The device does move slightly within the pocket, but that is unlikely to increase the risk of any lead fracture. Given that the risk of infection with a pocket revision outweights the benefit of the procedure, I have recommended that we leave the device as is unless she begins to experience significant pain from it. \par \par I appreciate the opportunity to be involved in her care.

## 2020-03-02 NOTE — HISTORY OF PRESENT ILLNESS
[FreeTextEntry1] : Dear Dr. Simpson, \par \par Ms. Carrillo presents for evaluation. As you know she is a 74 year old female with a history of atrial fibrillation, diabetes, hypertension, tachy-juan syndrome status post pacemaker in 2007 and generator change in 2018 who presents today for evaluation of her device. \par \par She states that since the generator change, she has noted that her device moves inside the pocket. It is not painful. She does note a little discomfort from time to time with the movement. She has not noted any swelling, erythema, TTP, or discharge. She is worried that the device moving may cause the leads to fracture. \par \par Device interrogation today shows an appropriately functioning pacemaker. \par \par She denies any dizziness, lightheadedness, or syncope.

## 2020-06-02 ENCOUNTER — APPOINTMENT (OUTPATIENT)
Dept: ELECTROPHYSIOLOGY | Facility: CLINIC | Age: 75
End: 2020-06-02
Payer: MEDICARE

## 2020-06-02 PROCEDURE — 93294 REM INTERROG EVL PM/LDLS PM: CPT

## 2020-06-02 PROCEDURE — 93296 REM INTERROG EVL PM/IDS: CPT

## 2020-08-04 ENCOUNTER — APPOINTMENT (OUTPATIENT)
Dept: CARDIOLOGY | Facility: CLINIC | Age: 75
End: 2020-08-04
Payer: MEDICARE

## 2020-08-04 VITALS — HEART RATE: 60 BPM | DIASTOLIC BLOOD PRESSURE: 68 MMHG | SYSTOLIC BLOOD PRESSURE: 124 MMHG | OXYGEN SATURATION: 96 %

## 2020-08-04 DIAGNOSIS — Z91.81 HISTORY OF FALLING: ICD-10-CM

## 2020-08-04 PROCEDURE — 99214 OFFICE O/P EST MOD 30 MIN: CPT

## 2020-08-04 RX ORDER — DIGOXIN 125 UG/1
125 TABLET ORAL DAILY
Qty: 90 | Refills: 1 | Status: ACTIVE | COMMUNITY
Start: 2019-08-09 | End: 1900-01-01

## 2020-08-04 RX ORDER — RIVAROXABAN 20 MG/1
20 TABLET, FILM COATED ORAL
Qty: 90 | Refills: 1 | Status: ACTIVE | COMMUNITY
Start: 2017-01-25 | End: 1900-01-01

## 2020-08-04 NOTE — PHYSICAL EXAM
[General Appearance - Well Developed] : well developed [Normal Appearance] : normal appearance [Well Groomed] : well groomed [General Appearance - Well Nourished] : well nourished [No Deformities] : no deformities [General Appearance - In No Acute Distress] : no acute distress [Eyelids - No Xanthelasma] : the eyelids demonstrated no xanthelasmas [Normal Conjunctiva] : the conjunctiva exhibited no abnormalities [No Oral Pallor] : no oral pallor [Normal Oral Mucosa] : normal oral mucosa [No Oral Cyanosis] : no oral cyanosis [Normal Jugular Venous A Waves Present] : normal jugular venous A waves present [No Jugular Venous Reid A Waves] : no jugular venous reid A waves [Normal Jugular Venous V Waves Present] : normal jugular venous V waves present [Respiration, Rhythm And Depth] : normal respiratory rhythm and effort [Exaggerated Use Of Accessory Muscles For Inspiration] : no accessory muscle use [Heart Sounds] : normal S1 and S2 [Heart Rate And Rhythm] : heart rate and rhythm were normal [Auscultation Breath Sounds / Voice Sounds] : lungs were clear to auscultation bilaterally [Murmurs] : no murmurs present [Abdomen Soft] : soft [Abdomen Tenderness] : non-tender [Abdomen Mass (___ Cm)] : no abdominal mass palpated [Gait - Sufficient For Exercise Testing] : the gait was sufficient for exercise testing [Abnormal Walk] : normal gait [Nail Clubbing] : no clubbing of the fingernails [Cyanosis, Localized] : no localized cyanosis [Petechial Hemorrhages (___cm)] : no petechial hemorrhages [No Venous Stasis] : no venous stasis [Skin Color & Pigmentation] : normal skin color and pigmentation [] : no rash [Skin Lesions] : no skin lesions [No Skin Ulcers] : no skin ulcer [No Xanthoma] : no  xanthoma was observed [Oriented To Time, Place, And Person] : oriented to person, place, and time [No Anxiety] : not feeling anxious [Mood] : the mood was normal [Affect] : the affect was normal

## 2020-08-06 PROBLEM — Z91.81 STATUS POST FALL: Status: ACTIVE | Noted: 2020-08-04

## 2020-08-06 NOTE — HISTORY OF PRESENT ILLNESS
[FreeTextEntry1] : Pt presents today for follow up. She had 2 falls over the past few months. Her most recent fall was last week when she tripped over shopping bag and hit her leg on the curb. Pt complaining of swelling worse in the evening. \par The patient presents for evaluation of high blood pressure. Patient is currently tolerating the current  antihypertensive regime and they deny headaches, stiff neck, visual changes, pedal Edema or PND. They also are here for follow-up of elevated cholesterol. Patient is currently tolerating medication and denies muscle pain, joint pain, back pain, tea colored urine ,nausea, vomiting, abdominal pain or diarrhea. The patient is trying to follow a low cholesterol diet.\par Atrial fibrillation\par The patient is also here for follow-up of atrial fibrillation and currently they feel well with an occasional episode of palpitations.  The patient states they are reliably taking the anticoagulation medicine and are not having any signs of bleeding they deny any dizziness headaches nosebleeds or black tarry stools\par Pt with pacemaker pt. reports no new  complaints of dizziness or palpitations . The patient is in a regular surveillance program and was advised to keep appointments for interrogation of their device.\par Follows with Dr. Perera.

## 2020-08-20 ENCOUNTER — APPOINTMENT (OUTPATIENT)
Dept: CARDIOLOGY | Facility: CLINIC | Age: 75
End: 2020-08-20
Payer: MEDICARE

## 2020-08-20 PROCEDURE — 93280 PM DEVICE PROGR EVAL DUAL: CPT

## 2020-09-01 ENCOUNTER — APPOINTMENT (OUTPATIENT)
Dept: ELECTROPHYSIOLOGY | Facility: CLINIC | Age: 75
End: 2020-09-01
Payer: MEDICARE

## 2020-09-01 PROCEDURE — 93296 REM INTERROG EVL PM/IDS: CPT

## 2020-09-01 PROCEDURE — 93294 REM INTERROG EVL PM/LDLS PM: CPT

## 2020-11-27 ENCOUNTER — NON-APPOINTMENT (OUTPATIENT)
Age: 75
End: 2020-11-27

## 2020-12-02 ENCOUNTER — APPOINTMENT (OUTPATIENT)
Dept: ELECTROPHYSIOLOGY | Facility: CLINIC | Age: 75
End: 2020-12-02
Payer: MEDICARE

## 2020-12-02 PROCEDURE — 93294 REM INTERROG EVL PM/LDLS PM: CPT

## 2020-12-02 PROCEDURE — 93296 REM INTERROG EVL PM/IDS: CPT

## 2020-12-04 DIAGNOSIS — Z01.818 ENCOUNTER FOR OTHER PREPROCEDURAL EXAMINATION: ICD-10-CM

## 2020-12-10 ENCOUNTER — NON-APPOINTMENT (OUTPATIENT)
Age: 75
End: 2020-12-10

## 2020-12-10 ENCOUNTER — APPOINTMENT (OUTPATIENT)
Dept: CARDIOLOGY | Facility: CLINIC | Age: 75
End: 2020-12-10
Payer: MEDICARE

## 2020-12-10 ENCOUNTER — MED ADMIN CHARGE (OUTPATIENT)
Age: 75
End: 2020-12-10

## 2020-12-10 VITALS
HEART RATE: 65 BPM | DIASTOLIC BLOOD PRESSURE: 80 MMHG | HEIGHT: 70 IN | TEMPERATURE: 98.1 F | WEIGHT: 201 LBS | SYSTOLIC BLOOD PRESSURE: 144 MMHG | BODY MASS INDEX: 28.77 KG/M2 | OXYGEN SATURATION: 99 %

## 2020-12-10 DIAGNOSIS — Z01.810 ENCOUNTER FOR PREPROCEDURAL CARDIOVASCULAR EXAMINATION: ICD-10-CM

## 2020-12-10 DIAGNOSIS — M25.519 PAIN IN UNSPECIFIED SHOULDER: ICD-10-CM

## 2020-12-10 DIAGNOSIS — E78.5 HYPERLIPIDEMIA, UNSPECIFIED: ICD-10-CM

## 2020-12-10 DIAGNOSIS — I10 ESSENTIAL (PRIMARY) HYPERTENSION: ICD-10-CM

## 2020-12-10 PROCEDURE — 93015 CV STRESS TEST SUPVJ I&R: CPT

## 2020-12-10 PROCEDURE — 78452 HT MUSCLE IMAGE SPECT MULT: CPT

## 2020-12-10 PROCEDURE — 99213 OFFICE O/P EST LOW 20 MIN: CPT | Mod: 25

## 2020-12-10 PROCEDURE — 99072 ADDL SUPL MATRL&STAF TM PHE: CPT

## 2020-12-10 PROCEDURE — 93000 ELECTROCARDIOGRAM COMPLETE: CPT | Mod: 59

## 2020-12-10 PROCEDURE — A9500: CPT

## 2020-12-10 RX ORDER — REGADENOSON 0.08 MG/ML
0.4 INJECTION, SOLUTION INTRAVENOUS
Qty: 1 | Refills: 0 | Status: COMPLETED | OUTPATIENT
Start: 2020-12-10

## 2020-12-10 RX ORDER — ASPIRIN 81 MG/1
81 TABLET, CHEWABLE ORAL DAILY
Qty: 60 | Refills: 1 | Status: ACTIVE | COMMUNITY
Start: 2020-12-10

## 2020-12-10 RX ADMIN — REGADENOSON 5 MG/5ML: 0.08 INJECTION, SOLUTION INTRAVENOUS at 00:00

## 2020-12-10 NOTE — HISTORY OF PRESENT ILLNESS
[FreeTextEntry1] : I was asked to see this delightful patient today for Pre-op Evaluation  prior to left rotator cuff sugery  with   Dr. Jordan Amaya   _____  .  The patient denies fever, cough, wheezing, sputum production, hemoptysis, dyspnea, congestion, diarrhea, constipation, nausea, vomiting, bright red blood per rectum, melena, angina, chest pain, palpitations, diaphoresis, PND, incontinence, frequency, urgency, dysuria, edema, joint pain, headache, weakness, numbness and dizziness. The date of the planned procedure is: 12/15/2020\par  Pt is scheduled for pre-op bloodwork tomorrow and nuclear stress test today.

## 2020-12-10 NOTE — PHYSICAL EXAM
[General Appearance - Well Developed] : well developed [Normal Appearance] : normal appearance [Well Groomed] : well groomed [General Appearance - Well Nourished] : well nourished [No Deformities] : no deformities [General Appearance - In No Acute Distress] : no acute distress [Normal Conjunctiva] : the conjunctiva exhibited no abnormalities [Eyelids - No Xanthelasma] : the eyelids demonstrated no xanthelasmas [Normal Oral Mucosa] : normal oral mucosa [No Oral Pallor] : no oral pallor [No Oral Cyanosis] : no oral cyanosis [Normal Jugular Venous A Waves Present] : normal jugular venous A waves present [Normal Jugular Venous V Waves Present] : normal jugular venous V waves present [No Jugular Venous Reid A Waves] : no jugular venous reid A waves [Respiration, Rhythm And Depth] : normal respiratory rhythm and effort [Exaggerated Use Of Accessory Muscles For Inspiration] : no accessory muscle use [Auscultation Breath Sounds / Voice Sounds] : lungs were clear to auscultation bilaterally [Abdomen Soft] : soft [Abdomen Tenderness] : non-tender [Abdomen Mass (___ Cm)] : no abdominal mass palpated [Gait - Sufficient For Exercise Testing] : the gait was sufficient for exercise testing [Nail Clubbing] : no clubbing of the fingernails [Cyanosis, Localized] : no localized cyanosis [Petechial Hemorrhages (___cm)] : no petechial hemorrhages [Skin Color & Pigmentation] : normal skin color and pigmentation [] : no rash [No Venous Stasis] : no venous stasis [Skin Lesions] : no skin lesions [No Skin Ulcers] : no skin ulcer [No Xanthoma] : no  xanthoma was observed [Oriented To Time, Place, And Person] : oriented to person, place, and time [Affect] : the affect was normal [Mood] : the mood was normal [No Anxiety] : not feeling anxious [FreeTextEntry1] : reproducible pain left shoulder area

## 2020-12-11 ENCOUNTER — NON-APPOINTMENT (OUTPATIENT)
Age: 75
End: 2020-12-11

## 2020-12-15 PROBLEM — Z12.11 ENCOUNTER FOR SCREENING COLONOSCOPY: Status: RESOLVED | Noted: 2017-10-20 | Resolved: 2020-12-15

## 2020-12-15 PROBLEM — Z86.19 HISTORY OF VIRAL PHARYNGITIS: Status: RESOLVED | Noted: 2017-07-25 | Resolved: 2020-12-15

## 2020-12-20 LAB
ALBUMIN SERPL ELPH-MCNC: 4.2 G/DL
ALP BLD-CCNC: 126 U/L
ALT SERPL-CCNC: 16 U/L
ANION GAP SERPL CALC-SCNC: 12 MMOL/L
AST SERPL-CCNC: 12 U/L
BASOPHILS # BLD AUTO: 0.03 K/UL
BASOPHILS NFR BLD AUTO: 0.5 %
BILIRUB SERPL-MCNC: 1.2 MG/DL
BUN SERPL-MCNC: 13 MG/DL
CALCIUM SERPL-MCNC: 9.7 MG/DL
CHLORIDE SERPL-SCNC: 100 MMOL/L
CO2 SERPL-SCNC: 27 MMOL/L
CREAT SERPL-MCNC: 0.71 MG/DL
DIGOXIN SERPL-MCNC: 0.7 NG/ML
EOSINOPHIL # BLD AUTO: 0.22 K/UL
EOSINOPHIL NFR BLD AUTO: 3.4 %
ESTIMATED AVERAGE GLUCOSE: 157 MG/DL
GLUCOSE SERPL-MCNC: 168 MG/DL
HBA1C MFR BLD HPLC: 7.1 %
HCT VFR BLD CALC: 41.2 %
HGB BLD-MCNC: 13.1 G/DL
IMM GRANULOCYTES NFR BLD AUTO: 0.2 %
LYMPHOCYTES # BLD AUTO: 2.03 K/UL
LYMPHOCYTES NFR BLD AUTO: 31 %
MAN DIFF?: NORMAL
MCHC RBC-ENTMCNC: 28.6 PG
MCHC RBC-ENTMCNC: 31.8 GM/DL
MCV RBC AUTO: 90 FL
MONOCYTES # BLD AUTO: 0.53 K/UL
MONOCYTES NFR BLD AUTO: 8.1 %
NEUTROPHILS # BLD AUTO: 3.73 K/UL
NEUTROPHILS NFR BLD AUTO: 56.8 %
PLATELET # BLD AUTO: 197 K/UL
POTASSIUM SERPL-SCNC: 3.6 MMOL/L
PROT SERPL-MCNC: 6.9 G/DL
RBC # BLD: 4.58 M/UL
RBC # FLD: 14.1 %
SODIUM SERPL-SCNC: 139 MMOL/L
TSH SERPL-ACNC: 2.32 UIU/ML
WBC # FLD AUTO: 6.55 K/UL

## 2020-12-31 ENCOUNTER — RX RENEWAL (OUTPATIENT)
Age: 75
End: 2020-12-31

## 2020-12-31 RX ORDER — HYDROCHLOROTHIAZIDE 25 MG/1
25 TABLET ORAL
Qty: 90 | Refills: 1 | Status: ACTIVE | COMMUNITY
Start: 2019-08-09 | End: 1900-01-01

## 2021-03-04 ENCOUNTER — NON-APPOINTMENT (OUTPATIENT)
Age: 76
End: 2021-03-04

## 2021-03-04 ENCOUNTER — APPOINTMENT (OUTPATIENT)
Dept: ELECTROPHYSIOLOGY | Facility: CLINIC | Age: 76
End: 2021-03-04
Payer: MEDICARE

## 2021-03-04 PROCEDURE — 93294 REM INTERROG EVL PM/LDLS PM: CPT

## 2021-03-04 PROCEDURE — 93296 REM INTERROG EVL PM/IDS: CPT

## 2021-06-03 ENCOUNTER — APPOINTMENT (OUTPATIENT)
Dept: ELECTROPHYSIOLOGY | Facility: CLINIC | Age: 76
End: 2021-06-03

## 2021-07-20 ENCOUNTER — APPOINTMENT (OUTPATIENT)
Dept: GASTROENTEROLOGY | Facility: CLINIC | Age: 76
End: 2021-07-20
Payer: MEDICARE

## 2021-07-20 VITALS
BODY MASS INDEX: 29.2 KG/M2 | WEIGHT: 204 LBS | HEIGHT: 70 IN | DIASTOLIC BLOOD PRESSURE: 80 MMHG | TEMPERATURE: 98 F | OXYGEN SATURATION: 98 % | HEART RATE: 60 BPM | SYSTOLIC BLOOD PRESSURE: 151 MMHG

## 2021-07-20 DIAGNOSIS — K59.09 OTHER CONSTIPATION: ICD-10-CM

## 2021-07-20 DIAGNOSIS — Z79.01 LONG TERM (CURRENT) USE OF ANTICOAGULANTS: ICD-10-CM

## 2021-07-20 DIAGNOSIS — I25.10 ATHEROSCLEROTIC HEART DISEASE OF NATIVE CORONARY ARTERY W/OUT ANGINA PECTORIS: ICD-10-CM

## 2021-07-20 DIAGNOSIS — Z95.0 PRESENCE OF CARDIAC PACEMAKER: ICD-10-CM

## 2021-07-20 DIAGNOSIS — R31.9 HEMATURIA, UNSPECIFIED: ICD-10-CM

## 2021-07-20 DIAGNOSIS — Z95.5 PRESENCE OF CORONARY ANGIOPLASTY IMPLANT AND GRAFT: ICD-10-CM

## 2021-07-20 DIAGNOSIS — K62.5 HEMORRHAGE OF ANUS AND RECTUM: ICD-10-CM

## 2021-07-20 DIAGNOSIS — I48.91 UNSPECIFIED ATRIAL FIBRILLATION: ICD-10-CM

## 2021-07-20 DIAGNOSIS — K63.5 POLYP OF COLON: ICD-10-CM

## 2021-07-20 PROCEDURE — 99205 OFFICE O/P NEW HI 60 MIN: CPT

## 2021-07-20 PROCEDURE — 99072 ADDL SUPL MATRL&STAF TM PHE: CPT

## 2021-07-20 RX ORDER — SODIUM SULFATE, POTASSIUM SULFATE, MAGNESIUM SULFATE 17.5; 3.13; 1.6 G/ML; G/ML; G/ML
17.5-3.13-1.6 SOLUTION, CONCENTRATE ORAL
Qty: 1 | Refills: 0 | Status: ACTIVE | COMMUNITY
Start: 2021-07-20 | End: 1900-01-01

## 2021-07-20 NOTE — HISTORY OF PRESENT ILLNESS
[Heartburn] : denies heartburn [Nausea] : denies nausea [Vomiting] : denies vomiting [Diarrhea] : denies diarrhea [Constipation] : constipation worsened [Yellow Skin Or Eyes (Jaundice)] : denies jaundice [Abdominal Pain] : abdominal pain worsened [Abdominal Swelling] : abdominal swelling worsened [Rectal Pain] : rectal pain worsened [Wt Gain ___ Lbs] : no recent weight gain [Wt Loss ___ Lbs] : no recent weight loss [GERD] : no gastroesophageal reflux disease [Hiatus Hernia] : no hiatus hernia [Peptic Ulcer Disease] : no peptic ulcer disease [Pancreatitis] : no pancreatitis [Kidney Stone] : kidney stone [Cholelithiasis] : no cholelithiasis [Inflammatory Bowel Disease] : no inflammatory bowel disease [Irritable Bowel Syndrome] : no irritable bowel syndrome [Diverticulitis] : no diverticulitis [Alcohol Abuse] : no alcohol abuse [Malignancy] : malignancy [Abdominal Surgery] : no abdominal surgery [Appendectomy] : no appendectomy [Cholecystectomy] : cholecystectomy [de-identified] : 76 year old woman with chronic constipation. she complains of lower abdominal pain aand occasional rectal bleeding. she has been undergoing evaluation fir episodes of gross hematuria. CT scan in 11/2020 showed cystitis. Her most recent colonoscopy in 2017 showed a small benign cecum polyp that was removed. She has CAD, a pacemaker a nd atrial fibrillation for which she is on Xarelto.

## 2021-07-20 NOTE — ASSESSMENT
[FreeTextEntry1] : Complex patient with worsening constipation and rectal bleeding with abdominal pain- will schedule diagnostic colonoscopy- follow up with urology\par CAD- needs cardiac clearance for colonoscopy.

## 2021-10-14 DIAGNOSIS — Z20.822 CONTACT WITH AND (SUSPECTED) EXPOSURE TO COVID-19: ICD-10-CM

## 2021-10-21 ENCOUNTER — APPOINTMENT (OUTPATIENT)
Dept: GASTROENTEROLOGY | Facility: AMBULATORY MEDICAL SERVICES | Age: 76
End: 2021-10-21
Payer: MEDICARE

## 2021-10-21 PROCEDURE — 45378 DIAGNOSTIC COLONOSCOPY: CPT
